# Patient Record
Sex: MALE | Race: BLACK OR AFRICAN AMERICAN | NOT HISPANIC OR LATINO | Employment: PART TIME | URBAN - METROPOLITAN AREA
[De-identification: names, ages, dates, MRNs, and addresses within clinical notes are randomized per-mention and may not be internally consistent; named-entity substitution may affect disease eponyms.]

---

## 2017-11-13 ENCOUNTER — OFFICE VISIT (OUTPATIENT)
Dept: OPHTHALMOLOGY | Facility: CLINIC | Age: 22
End: 2017-11-13

## 2017-11-13 DIAGNOSIS — H35.462: ICD-10-CM

## 2017-11-13 DIAGNOSIS — H52.13 MYOPIA OF BOTH EYES: Primary | ICD-10-CM

## 2017-11-13 ASSESSMENT — VISUAL ACUITY
OS_SC+: -2
OS_PH_SC: 20/20-
OD_SC: 20/40
OS_SC: 20/20
OD_SC+: -1
OD_PH_SC: 20/20-
METHOD_MR_RETINOSCOPY: 1
METHOD: SNELLEN - LINEAR
OS_SC: 20/30
OD_SC: 20/20

## 2017-11-13 ASSESSMENT — CUP TO DISC RATIO
OS_RATIO: 0.3
OD_RATIO: 0.3

## 2017-11-13 ASSESSMENT — REFRACTION_MANIFEST
OS_CYLINDER: +0.25
OD_SPHERE: -1.00
OD_AXIS: 075
OS_SPHERE: -0.50
OD_SPHERE: -0.25
OS_AXIS: 100
OS_CYLINDER: +0.75
OS_AXIS: 090
OD_CYLINDER: +0.25
OD_CYLINDER: +0.50
OS_SPHERE: -1.00
OD_AXIS: 090

## 2017-11-13 ASSESSMENT — TONOMETRY
IOP_METHOD: ICARE
OS_IOP_MMHG: 14
OD_IOP_MMHG: 15

## 2017-11-13 ASSESSMENT — SLIT LAMP EXAM - LIDS
COMMENTS: NORMAL
COMMENTS: NORMAL

## 2017-11-13 ASSESSMENT — EXTERNAL EXAM - RIGHT EYE: OD_EXAM: NORMAL

## 2017-11-13 ASSESSMENT — EXTERNAL EXAM - LEFT EYE: OS_EXAM: NORMAL

## 2017-11-13 ASSESSMENT — CONF VISUAL FIELD
OD_NORMAL: 1
OS_NORMAL: 1

## 2017-11-13 NOTE — PROGRESS NOTES
Assessment/Plan  (H52.13) Myopia of both eyes  (primary encounter diagnosis)  Comment: Myopia OU, first eye exam  Plan: REFRACTION [23040]         Educated patient on condition and clinical findings. Dispensed spectacle prescription for full time wear. Educated patient on possibility of adaptation period, if symptoms do not improve return to clinic for further testing.    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 Mai for consultation      Complete documentation of historical and exam elements from today's encounter can  be found in the full encounter summary report (not reduplicated in this progress  note). I personally obtained the chief complaint(s) and history of present illness. I  confirmed and edited as necessary the review of systems, past medical/surgical  history, family history, social history, and examination findings as documented by  others; and I examined the patient myself. I personally reviewed the relevant tests,  images, and reports as documented above. I formulated and edited as necessary the  assessment and plan and discussed the findings and management plan with the  patient and family.    Holger Frank, NAMAN, FAAO

## 2017-11-13 NOTE — MR AVS SNAPSHOT
After Visit Summary   2017    Brie Felipe    MRN: [id number]           Patient Information     Date Of Birth          1995        Visit Information        Provider Department      2017 10:40 AM Holger Frank OD [facility]         Follow-ups after your visit        Your next 10 appointments already scheduled     2017  9:15 AM CST   NEW RETINA with Fredericus Darinel Mai MD   Eye Clinic ([facility])    [address]  [address]  [address]  [facility] [address]   [phone]              Who to contact     Please call your clinic at [phone] to:    Ask questions about your health    Make or cancel appointments    Discuss your medicines    Learn about your test results    Speak to your doctor   If you have compliments or concerns about an experience at your clinic, or if you wish to file a complaint, please contact [facility] Patient Relations at [phone] or email us at [email or web address]         Additional Information About Your Visit        MyChart Information     Pixlee is an electronic gateway that provides easy, online access to your medical records. With Pixlee, you can request a clinic appointment, read your test results, renew a prescription or communicate with your care team.     To sign up for Pixlee visit the website at [email or web address]   You will be asked to enter the access code listed below, as well as some personal information. Please follow the directions to create your username and password.     Your access code is: [email or web address]  Expires: 2018 12:15 PM     Your access code will  in 90 days. If you need help or a new code, please contact your [facility] or call [phone] for assistance.        Care EveryWhere ID     This is your Care EveryWhere ID. This could be used by other  organizations to access your [place] medical records  [id number]         Blood Pressure from Last 3 Encounters:   No data found for BP    Weight from Last 3 Encounters:   No data found for Wt              Today, you had the following     No orders found for display       Primary Care Provider    None Specified       No primary provider on file.        Equal Access to Services     TENISHAMILLI JOANNE : Hadii aad solo hadsylviao Sosarwatali, waaxda luqadaha, qaybta kaalmada adeegyada, brody dumont sallyhelene dugandanaisabelle gannon . So [facility] [phone].    ATENCIÓN: Si habla español, tiene a barbosa disposición servicios gratuitos de asistencia lingüística. Llame al [phone].    We comply with applicable federal civil rights laws and [place] laws. We do not discriminate on the basis of race, color, national origin, age, disability, sex, sexual orientation, or gender identity.            Thank you!     Thank you for [facility]  for your care. Our goal is always to provide you with excellent care. Hearing back from our patients is one way we can continue to improve our services. Please take a few minutes to complete the written survey that you may receive in the mail after your visit with us. Thank you!             Your Updated Medication List - Protect others around you: Learn how to safely use, store and throw away your medicines at www.disposemymeds.org.      Notice  As of 11/13/2017 11:48 AM    You have not been prescribed any medications.

## 2017-11-16 ENCOUNTER — OFFICE VISIT (OUTPATIENT)
Dept: OPHTHALMOLOGY | Facility: CLINIC | Age: 22
End: 2017-11-16
Attending: OPHTHALMOLOGY
Payer: COMMERCIAL

## 2017-11-16 DIAGNOSIS — H35.462: Primary | ICD-10-CM

## 2017-11-16 PROCEDURE — 92250 FUNDUS PHOTOGRAPHY W/I&R: CPT | Mod: ZF | Performed by: OPHTHALMOLOGY

## 2017-11-16 PROCEDURE — 99213 OFFICE O/P EST LOW 20 MIN: CPT | Mod: ZF

## 2017-11-16 ASSESSMENT — REFRACTION_WEARINGRX
OS_SPHERE: -1.00
OD_SPHERE: -1.00
OS_CYLINDER: +0.25
OS_AXIS: 100
OD_CYLINDER: +0.25
OD_AXIS: 075

## 2017-11-16 ASSESSMENT — EXTERNAL EXAM - LEFT EYE: OS_EXAM: NORMAL

## 2017-11-16 ASSESSMENT — TONOMETRY
OD_IOP_MMHG: 18
IOP_METHOD: TONOPEN
OS_IOP_MMHG: 17

## 2017-11-16 ASSESSMENT — CUP TO DISC RATIO
OD_RATIO: 0.3
OS_RATIO: 0.3

## 2017-11-16 ASSESSMENT — VISUAL ACUITY
CORRECTION_TYPE: GLASSES
OD_CC: 20/20
METHOD: SNELLEN - LINEAR
OS_CC: 20/20
OD_CC: 20/20
METHOD: SNELLEN - LINEAR

## 2017-11-16 ASSESSMENT — SLIT LAMP EXAM - LIDS
COMMENTS: NORMAL
COMMENTS: NORMAL

## 2017-11-16 ASSESSMENT — CONF VISUAL FIELD
OS_NORMAL: 1
OD_NORMAL: 1

## 2017-11-16 ASSESSMENT — EXTERNAL EXAM - RIGHT EYE: OD_EXAM: NORMAL

## 2017-11-16 NOTE — NURSING NOTE
Chief Complaints and History of Present Illnesses   Patient presents with     Consult For     vitreous tuft     HPI    Additional Referring Providers:  referred by Dr. Frank   Affected eye(s):  Both   Symptoms:        Unknown duration    Frequency:  Constant       Do you have eye pain now?:  No      Comments:  He was referred by Dr Frank for a consult for vitreous tuft OU  He has a glasses RX but it is still being filled. He says his vision is better with glasses on.   He says for the past three weeks or so when he wakes, he sometimes has discomfort in both eyes.   He says he sees floaters sometimes but denies flashes.    Heriberto James COT 10:00 AM November 16, 2017

## 2017-11-16 NOTE — PROGRESS NOTES
I have confirmed the patient's and reviewed Past Medical History, Past Surgical History, Social History, Family History, Problem List, Medication List and agree with Tech note.    CC: blurry vision    HPI: Brie Felipe is a 22 year old male who is referred by Dr Frank for evaluation of vitreous tuft, left eye. Patient reports having blurry vision and was given new eye glasses prescription. Complains of some eye redness. Denies current photopsias. Has some rare flashing lights.     Assessment/plan:   1.  Cystic retinal lesion, left eye   - OPTOS photo today       RTC 3 months    Pedro Luis Lopze MD  PGY3    ATTESTATION:  I have seen and examined the patient with Dr. Lopez and agree with the findings in this note, as well as the interpretations of the diagnostic tests.    Sb Dong MD PhD.  Professor & Chair

## 2017-11-16 NOTE — MR AVS SNAPSHOT
After Visit Summary   2017    Brie Felipe    MRN: [id number]           Patient Information     Date Of Birth          1995        Visit Information        Provider Department      2017 9:15 AM Sb Mai MD [facility]        Today's Diagnoses     Cystic retinal tuft, left eye    -  1       Follow-ups after your visit        Follow-up notes from your care team     Return in about 4 weeks (around 2017) for Exam & OCT OU.      Who to contact     Please call your clinic at [phone] to:    Ask questions about your health    Make or cancel appointments    Discuss your medicines    Learn about your test results    Speak to your doctor   If you have compliments or concerns about an experience at your clinic, or if you wish to file a complaint, please contact [facility] Patient Relations at [phone] or email us at [email or web address]         Additional Information About Your Visit        MyChart Information     "[employer]t is an electronic gateway that provides easy, online access to your medical records. With Velostack, you can request a clinic appointment, read your test results, renew a prescription or communicate with your care team.     To sign up for "[employer]t visit the website at [email or web address]   You will be asked to enter the access code listed below, as well as some personal information. Please follow the directions to create your username and password.     Your access code is: [email or web address]  Expires: 2018 12:15 PM     Your access code will  in 90 days. If you need help or a new code, please contact your [facility] or call [phone] for assistance.        Care EveryWhere ID     This is your Care EveryWhere ID. This could be used by other organizations to access your [place] medical records  [id number]         Blood Pressure from Last 3 Encounters:   No  data found for BP    Weight from Last 3 Encounters:   No data found for Wt              We Performed the Following     Fundus Photos OS (left eye)        Primary Care Provider    None Specified       No primary provider on file.        Equal Access to Services     DIMITRI RUBIO : Fausto Koehler, clari hurtado, lucíanirali vyasjovana joserupesh, waxhailey rosalindain hayaahelene garciataylor hernandes jagdeep gutierrez. So [facility] [phone].    ATENCIÓN: Si habla español, tiene a barbosa disposición servicios gratuitos de asistencia lingüística. Llame al [phone].    We comply with applicable federal civil rights laws and [place] laws. We do not discriminate on the basis of race, color, national origin, age, disability, sex, sexual orientation, or gender identity.            Thank you!     Thank you for choosing EYE CLINIC  for your care. Our goal is always to provide you with excellent care. Hearing back from our patients is one way we can continue to improve our services. Please take a few minutes to complete the written survey that you may receive in the mail after your visit with us. Thank you!             Your Updated Medication List - Protect others around you: Learn how to safely use, store and throw away your medicines at www.disposemymeds.org.      Notice  As of 11/16/2017 12:03 PM    You have not been prescribed any medications.

## 2022-09-23 RX ORDER — LANOLIN ALCOHOL/MO/W.PET/CERES
100 CREAM (GRAM) TOPICAL DAILY
COMMUNITY

## 2022-09-27 ENCOUNTER — ANESTHESIA EVENT (OUTPATIENT)
Dept: SURGERY | Facility: CLINIC | Age: 27
End: 2022-09-27
Payer: COMMERCIAL

## 2022-09-27 ENCOUNTER — APPOINTMENT (OUTPATIENT)
Dept: GENERAL RADIOLOGY | Facility: CLINIC | Age: 27
End: 2022-09-27
Attending: ORTHOPAEDIC SURGERY
Payer: COMMERCIAL

## 2022-09-27 ENCOUNTER — HOSPITAL ENCOUNTER (OUTPATIENT)
Facility: CLINIC | Age: 27
Discharge: HOME | End: 2022-09-27
Attending: ORTHOPAEDIC SURGERY | Admitting: ORTHOPAEDIC SURGERY
Payer: COMMERCIAL

## 2022-09-27 ENCOUNTER — ANESTHESIA (OUTPATIENT)
Dept: SURGERY | Facility: CLINIC | Age: 27
End: 2022-09-27
Payer: COMMERCIAL

## 2022-09-27 VITALS
TEMPERATURE: 97.5 F | BODY MASS INDEX: 21.22 KG/M2 | DIASTOLIC BLOOD PRESSURE: 91 MMHG | HEART RATE: 95 BPM | HEIGHT: 72 IN | SYSTOLIC BLOOD PRESSURE: 125 MMHG | RESPIRATION RATE: 15 BRPM | WEIGHT: 156.7 LBS | OXYGEN SATURATION: 100 %

## 2022-09-27 DIAGNOSIS — S83.511A RUPTURE OF ANTERIOR CRUCIATE LIGAMENT OF RIGHT KNEE, INITIAL ENCOUNTER: Primary | ICD-10-CM

## 2022-09-27 PROCEDURE — 710N000012 HC RECOVERY PHASE 2, PER MINUTE: Performed by: ORTHOPAEDIC SURGERY

## 2022-09-27 PROCEDURE — 258N000001 HC RX 258: Performed by: ORTHOPAEDIC SURGERY

## 2022-09-27 PROCEDURE — 999N000141 HC STATISTIC PRE-PROCEDURE NURSING ASSESSMENT: Performed by: ORTHOPAEDIC SURGERY

## 2022-09-27 PROCEDURE — 710N000009 HC RECOVERY PHASE 1, LEVEL 1, PER MIN: Performed by: ORTHOPAEDIC SURGERY

## 2022-09-27 PROCEDURE — 250N000011 HC RX IP 250 OP 636: Performed by: ORTHOPAEDIC SURGERY

## 2022-09-27 PROCEDURE — 250N000009 HC RX 250: Performed by: ORTHOPAEDIC SURGERY

## 2022-09-27 PROCEDURE — 250N000011 HC RX IP 250 OP 636: Performed by: NURSE ANESTHETIST, CERTIFIED REGISTERED

## 2022-09-27 PROCEDURE — 999N000179 XR SURGERY CARM FLUORO LESS THAN 5 MIN W STILLS: Mod: TC

## 2022-09-27 PROCEDURE — C1713 ANCHOR/SCREW BN/BN,TIS/BN: HCPCS | Performed by: ORTHOPAEDIC SURGERY

## 2022-09-27 PROCEDURE — 258N000003 HC RX IP 258 OP 636: Performed by: NURSE ANESTHETIST, CERTIFIED REGISTERED

## 2022-09-27 PROCEDURE — 360N000084 HC SURGERY LEVEL 4 W/ FLUORO, PER MIN: Performed by: ORTHOPAEDIC SURGERY

## 2022-09-27 PROCEDURE — C1762 CONN TISS, HUMAN(INC FASCIA): HCPCS | Performed by: ORTHOPAEDIC SURGERY

## 2022-09-27 PROCEDURE — 370N000017 HC ANESTHESIA TECHNICAL FEE, PER MIN: Performed by: ORTHOPAEDIC SURGERY

## 2022-09-27 PROCEDURE — 272N000002 HC OR SUPPLY OTHER OPNP: Performed by: ORTHOPAEDIC SURGERY

## 2022-09-27 PROCEDURE — 250N000013 HC RX MED GY IP 250 OP 250 PS 637: Performed by: PHYSICIAN ASSISTANT

## 2022-09-27 PROCEDURE — 250N000013 HC RX MED GY IP 250 OP 250 PS 637: Performed by: ORTHOPAEDIC SURGERY

## 2022-09-27 PROCEDURE — 250N000011 HC RX IP 250 OP 636: Performed by: ANESTHESIOLOGY

## 2022-09-27 PROCEDURE — 272N000001 HC OR GENERAL SUPPLY STERILE: Performed by: ORTHOPAEDIC SURGERY

## 2022-09-27 DEVICE — TIGHTROPE® II ABS, IMPLANT
Type: IMPLANTABLE DEVICE | Site: KNEE | Status: FUNCTIONAL
Brand: ARTHREX®

## 2022-09-27 DEVICE — BIO-COMP SWIVELOCK C, CLD 5.5X19.1MM
Type: IMPLANTABLE DEVICE | Site: KNEE | Status: FUNCTIONAL
Brand: ARTHREX®

## 2022-09-27 DEVICE — HTO ANCHOR, IBALANCE CORTICAL 32MM
Type: IMPLANTABLE DEVICE | Site: KNEE | Status: FUNCTIONAL
Brand: ARTHREX®

## 2022-09-27 DEVICE — Ø7X 20MM BC IF SCRW, VENTED
Type: IMPLANTABLE DEVICE | Site: KNEE | Status: FUNCTIONAL
Brand: ARTHREX®

## 2022-09-27 DEVICE — HTO ANCHOR, IBALANCE CORTICAL 42MM
Type: IMPLANTABLE DEVICE | Site: KNEE | Status: FUNCTIONAL
Brand: ARTHREX®

## 2022-09-27 DEVICE — HTO ANCHOR, IBALANCE CANCELLOUS 24MM
Type: IMPLANTABLE DEVICE | Site: KNEE | Status: FUNCTIONAL
Brand: ARTHREX®

## 2022-09-27 DEVICE — IMP ANCHOR ARTHREX BIO-SWIVELOCK 4.75X22MM AR-2324BCC-2: Type: IMPLANTABLE DEVICE | Site: KNEE | Status: FUNCTIONAL

## 2022-09-27 DEVICE — HTO IMPL,IBAL SM 6°/MED 5°
Type: IMPLANTABLE DEVICE | Site: KNEE | Status: FUNCTIONAL
Brand: ARTHREX®

## 2022-09-27 DEVICE — TIGHTROPE ABS BUTTON ROUND 14MM CONCAVE
Type: IMPLANTABLE DEVICE | Site: KNEE | Status: FUNCTIONAL
Brand: ARTHREX®

## 2022-09-27 DEVICE — SWVLK TENO BIO-COMP 7X 19.5MM
Type: IMPLANTABLE DEVICE | Site: KNEE | Status: FUNCTIONAL
Brand: ARTHREX®

## 2022-09-27 DEVICE — HTO ANCHOR, IBALANCE CANCELLOUS 30MM
Type: IMPLANTABLE DEVICE | Site: KNEE | Status: FUNCTIONAL
Brand: ARTHREX®

## 2022-09-27 DEVICE — GRAFT TENDON TIBIALIS ANTERIOR 430335: Type: IMPLANTABLE DEVICE | Site: KNEE | Status: FUNCTIONAL

## 2022-09-27 RX ORDER — OXYCODONE HYDROCHLORIDE 5 MG/1
5 TABLET ORAL EVERY 4 HOURS PRN
Status: DISCONTINUED | OUTPATIENT
Start: 2022-09-27 | End: 2022-09-27 | Stop reason: HOSPADM

## 2022-09-27 RX ORDER — AMOXICILLIN 250 MG
1-2 CAPSULE ORAL 2 TIMES DAILY
Qty: 30 TABLET | Refills: 0 | Status: SHIPPED | OUTPATIENT
Start: 2022-09-27

## 2022-09-27 RX ORDER — SODIUM CHLORIDE, SODIUM LACTATE, POTASSIUM CHLORIDE, CALCIUM CHLORIDE 600; 310; 30; 20 MG/100ML; MG/100ML; MG/100ML; MG/100ML
INJECTION, SOLUTION INTRAVENOUS CONTINUOUS
Status: DISCONTINUED | OUTPATIENT
Start: 2022-09-27 | End: 2022-09-27 | Stop reason: HOSPADM

## 2022-09-27 RX ORDER — CEFAZOLIN SODIUM/WATER 2 G/20 ML
2 SYRINGE (ML) INTRAVENOUS SEE ADMIN INSTRUCTIONS
Status: DISCONTINUED | OUTPATIENT
Start: 2022-09-27 | End: 2022-09-27 | Stop reason: HOSPADM

## 2022-09-27 RX ORDER — ACETAMINOPHEN 325 MG/1
650 TABLET ORAL EVERY 4 HOURS PRN
Qty: 50 TABLET | Refills: 0 | Status: SHIPPED | OUTPATIENT
Start: 2022-09-27

## 2022-09-27 RX ORDER — OXYCODONE HYDROCHLORIDE 5 MG/1
5 TABLET ORAL
Status: COMPLETED | OUTPATIENT
Start: 2022-09-27 | End: 2022-09-27

## 2022-09-27 RX ORDER — METOPROLOL TARTRATE 1 MG/ML
1-2 INJECTION, SOLUTION INTRAVENOUS EVERY 5 MIN PRN
Status: DISCONTINUED | OUTPATIENT
Start: 2022-09-27 | End: 2022-09-27 | Stop reason: HOSPADM

## 2022-09-27 RX ORDER — HYDRALAZINE HYDROCHLORIDE 20 MG/ML
2.5-5 INJECTION INTRAMUSCULAR; INTRAVENOUS EVERY 10 MIN PRN
Status: DISCONTINUED | OUTPATIENT
Start: 2022-09-27 | End: 2022-09-27 | Stop reason: HOSPADM

## 2022-09-27 RX ORDER — TRANEXAMIC ACID 650 MG/1
1950 TABLET ORAL ONCE
Status: COMPLETED | OUTPATIENT
Start: 2022-09-27 | End: 2022-09-27

## 2022-09-27 RX ORDER — FENTANYL CITRATE 50 UG/ML
50 INJECTION, SOLUTION INTRAMUSCULAR; INTRAVENOUS
Status: DISCONTINUED | OUTPATIENT
Start: 2022-09-27 | End: 2022-09-27 | Stop reason: HOSPADM

## 2022-09-27 RX ORDER — ONDANSETRON 2 MG/ML
4 INJECTION INTRAMUSCULAR; INTRAVENOUS EVERY 30 MIN PRN
Status: DISCONTINUED | OUTPATIENT
Start: 2022-09-27 | End: 2022-09-27 | Stop reason: HOSPADM

## 2022-09-27 RX ORDER — OXYCODONE HYDROCHLORIDE 5 MG/1
5 TABLET ORAL EVERY 4 HOURS
Qty: 30 TABLET | Refills: 0 | Status: SHIPPED | OUTPATIENT
Start: 2022-09-27 | End: 2022-09-30

## 2022-09-27 RX ORDER — DEXAMETHASONE SODIUM PHOSPHATE 4 MG/ML
INJECTION, SOLUTION INTRA-ARTICULAR; INTRALESIONAL; INTRAMUSCULAR; INTRAVENOUS; SOFT TISSUE PRN
Status: DISCONTINUED | OUTPATIENT
Start: 2022-09-27 | End: 2022-09-27

## 2022-09-27 RX ORDER — KETOROLAC TROMETHAMINE 15 MG/ML
15 INJECTION, SOLUTION INTRAMUSCULAR; INTRAVENOUS EVERY 6 HOURS PRN
Status: DISCONTINUED | OUTPATIENT
Start: 2022-09-27 | End: 2022-09-27 | Stop reason: HOSPADM

## 2022-09-27 RX ORDER — PROPOFOL 10 MG/ML
INJECTION, EMULSION INTRAVENOUS PRN
Status: DISCONTINUED | OUTPATIENT
Start: 2022-09-27 | End: 2022-09-27

## 2022-09-27 RX ORDER — FENTANYL CITRATE 50 UG/ML
INJECTION, SOLUTION INTRAMUSCULAR; INTRAVENOUS PRN
Status: DISCONTINUED | OUTPATIENT
Start: 2022-09-27 | End: 2022-09-27

## 2022-09-27 RX ORDER — IBUPROFEN 600 MG/1
600 TABLET, FILM COATED ORAL
Status: DISCONTINUED | OUTPATIENT
Start: 2022-09-27 | End: 2022-09-27 | Stop reason: HOSPADM

## 2022-09-27 RX ORDER — HYDROMORPHONE HCL IN WATER/PF 6 MG/30 ML
0.4 PATIENT CONTROLLED ANALGESIA SYRINGE INTRAVENOUS EVERY 5 MIN PRN
Status: DISCONTINUED | OUTPATIENT
Start: 2022-09-27 | End: 2022-09-27 | Stop reason: HOSPADM

## 2022-09-27 RX ORDER — FENTANYL CITRATE 50 UG/ML
50 INJECTION, SOLUTION INTRAMUSCULAR; INTRAVENOUS EVERY 5 MIN PRN
Status: DISCONTINUED | OUTPATIENT
Start: 2022-09-27 | End: 2022-09-27 | Stop reason: HOSPADM

## 2022-09-27 RX ORDER — LIDOCAINE 40 MG/G
CREAM TOPICAL
Status: DISCONTINUED | OUTPATIENT
Start: 2022-09-27 | End: 2022-09-27 | Stop reason: HOSPADM

## 2022-09-27 RX ORDER — MEPERIDINE HYDROCHLORIDE 25 MG/ML
12.5 INJECTION INTRAMUSCULAR; INTRAVENOUS; SUBCUTANEOUS
Status: DISCONTINUED | OUTPATIENT
Start: 2022-09-27 | End: 2022-09-27 | Stop reason: HOSPADM

## 2022-09-27 RX ORDER — SODIUM CHLORIDE, SODIUM LACTATE, POTASSIUM CHLORIDE, CALCIUM CHLORIDE 600; 310; 30; 20 MG/100ML; MG/100ML; MG/100ML; MG/100ML
INJECTION, SOLUTION INTRAVENOUS CONTINUOUS PRN
Status: DISCONTINUED | OUTPATIENT
Start: 2022-09-27 | End: 2022-09-27

## 2022-09-27 RX ORDER — CEFAZOLIN SODIUM/WATER 2 G/20 ML
2 SYRINGE (ML) INTRAVENOUS
Status: DISCONTINUED | OUTPATIENT
Start: 2022-09-27 | End: 2022-09-27 | Stop reason: HOSPADM

## 2022-09-27 RX ORDER — IBUPROFEN 600 MG/1
600 TABLET, FILM COATED ORAL EVERY 6 HOURS PRN
Qty: 40 TABLET | Refills: 0 | Status: SHIPPED | OUTPATIENT
Start: 2022-09-27

## 2022-09-27 RX ORDER — ONDANSETRON 4 MG/1
4 TABLET, ORALLY DISINTEGRATING ORAL EVERY 8 HOURS PRN
Qty: 4 TABLET | Refills: 0 | Status: SHIPPED | OUTPATIENT
Start: 2022-09-27

## 2022-09-27 RX ORDER — MAGNESIUM HYDROXIDE 1200 MG/15ML
LIQUID ORAL PRN
Status: DISCONTINUED | OUTPATIENT
Start: 2022-09-27 | End: 2022-09-27 | Stop reason: HOSPADM

## 2022-09-27 RX ORDER — ACETAMINOPHEN 325 MG/1
975 TABLET ORAL ONCE
Status: COMPLETED | OUTPATIENT
Start: 2022-09-27 | End: 2022-09-27

## 2022-09-27 RX ORDER — VANCOMYCIN HYDROCHLORIDE 1 G/20ML
INJECTION, POWDER, LYOPHILIZED, FOR SOLUTION INTRAVENOUS PRN
Status: DISCONTINUED | OUTPATIENT
Start: 2022-09-27 | End: 2022-09-27 | Stop reason: HOSPADM

## 2022-09-27 RX ORDER — ONDANSETRON 2 MG/ML
INJECTION INTRAMUSCULAR; INTRAVENOUS PRN
Status: DISCONTINUED | OUTPATIENT
Start: 2022-09-27 | End: 2022-09-27

## 2022-09-27 RX ORDER — ONDANSETRON 4 MG/1
4 TABLET, ORALLY DISINTEGRATING ORAL
Status: DISCONTINUED | OUTPATIENT
Start: 2022-09-27 | End: 2022-09-27 | Stop reason: HOSPADM

## 2022-09-27 RX ORDER — ONDANSETRON 4 MG/1
4 TABLET, ORALLY DISINTEGRATING ORAL EVERY 30 MIN PRN
Status: DISCONTINUED | OUTPATIENT
Start: 2022-09-27 | End: 2022-09-27 | Stop reason: HOSPADM

## 2022-09-27 RX ORDER — HYDROXYZINE HYDROCHLORIDE 25 MG/1
25 TABLET, FILM COATED ORAL 3 TIMES DAILY PRN
Qty: 40 TABLET | Refills: 0 | Status: SHIPPED | OUTPATIENT
Start: 2022-09-27

## 2022-09-27 RX ADMIN — FENTANYL CITRATE 100 MCG: 50 INJECTION, SOLUTION INTRAMUSCULAR; INTRAVENOUS at 07:57

## 2022-09-27 RX ADMIN — DEXAMETHASONE SODIUM PHOSPHATE 8 MG: 4 INJECTION, SOLUTION INTRA-ARTICULAR; INTRALESIONAL; INTRAMUSCULAR; INTRAVENOUS; SOFT TISSUE at 08:14

## 2022-09-27 RX ADMIN — FENTANYL CITRATE 100 MCG: 50 INJECTION, SOLUTION INTRAMUSCULAR; INTRAVENOUS at 09:25

## 2022-09-27 RX ADMIN — Medication 2 G: at 12:14

## 2022-09-27 RX ADMIN — HYDROMORPHONE HYDROCHLORIDE 1 MG: 1 INJECTION, SOLUTION INTRAMUSCULAR; INTRAVENOUS; SUBCUTANEOUS at 12:07

## 2022-09-27 RX ADMIN — ONDANSETRON 4 MG: 2 INJECTION INTRAMUSCULAR; INTRAVENOUS at 14:52

## 2022-09-27 RX ADMIN — SODIUM CHLORIDE, POTASSIUM CHLORIDE, SODIUM LACTATE AND CALCIUM CHLORIDE: 600; 310; 30; 20 INJECTION, SOLUTION INTRAVENOUS at 06:57

## 2022-09-27 RX ADMIN — PROPOFOL 200 MG: 10 INJECTION, EMULSION INTRAVENOUS at 08:14

## 2022-09-27 RX ADMIN — MIDAZOLAM 2 MG: 1 INJECTION INTRAMUSCULAR; INTRAVENOUS at 07:57

## 2022-09-27 RX ADMIN — OXYCODONE HYDROCHLORIDE 5 MG: 5 TABLET ORAL at 13:55

## 2022-09-27 RX ADMIN — ONDANSETRON HYDROCHLORIDE 4 MG: 2 INJECTION, SOLUTION INTRAVENOUS at 12:28

## 2022-09-27 RX ADMIN — SODIUM CHLORIDE, POTASSIUM CHLORIDE, SODIUM LACTATE AND CALCIUM CHLORIDE: 600; 310; 30; 20 INJECTION, SOLUTION INTRAVENOUS at 10:04

## 2022-09-27 RX ADMIN — FENTANYL CITRATE 100 MCG: 50 INJECTION, SOLUTION INTRAMUSCULAR; INTRAVENOUS at 08:15

## 2022-09-27 RX ADMIN — Medication 2 G: at 08:14

## 2022-09-27 RX ADMIN — TRANEXAMIC ACID 1950 MG: 650 TABLET ORAL at 07:25

## 2022-09-27 RX ADMIN — ACETAMINOPHEN 975 MG: 325 TABLET, FILM COATED ORAL at 07:25

## 2022-09-27 ASSESSMENT — ACTIVITIES OF DAILY LIVING (ADL)
ADLS_ACUITY_SCORE: 37
ADLS_ACUITY_SCORE: 35

## 2022-09-27 NOTE — DISCHARGE INSTRUCTIONS
GENERAL ANESTHESIA OR SEDATION ADULT DISCHARGE INSTRUCTIONS   SPECIAL PRECAUTIONS FOR 24 HOURS AFTER SURGERY    IT IS NOT UNUSUAL TO FEEL LIGHT-HEADED OR FAINT, UP TO 24 HOURS AFTER SURGERY OR WHILE TAKING PAIN MEDICATION.  IF YOU HAVE THESE SYMPTOMS; SIT FOR A FEW MINUTES BEFORE STANDING AND HAVE SOMEONE ASSIST YOU WHEN YOU GET UP TO WALK OR USE THE BATHROOM.    YOU SHOULD REST AND RELAX FOR THE NEXT 24 HOURS AND YOU MUST MAKE ARRANGEMENTS TO HAVE SOMEONE STAY WITH YOU FOR AT LEAST 24 HOURS AFTER YOUR DISCHARGE.  AVOID HAZARDOUS AND STRENUOUS ACTIVITIES.  DO NOT MAKE IMPORTANT DECISIONS FOR 24 HOURS.    DO NOT DRIVE ANY VEHICLE OR OPERATE MECHANICAL EQUIPMENT FOR 24 HOURS FOLLOWING THE END OF YOUR SURGERY.  EVEN THOUGH YOU MAY FEEL NORMAL, YOUR REACTIONS MAY BE AFFECTED BY THE MEDICATION YOU HAVE RECEIVED.    DO NOT DRINK ALCOHOLIC BEVERAGES FOR 24 HOURS FOLLOWING YOUR SURGERY.    DRINK CLEAR LIQUIDS (APPLE JUICE, GINGER ALE, 7-UP, BROTH, ETC.).  PROGRESS TO YOUR REGULAR DIET AS YOU FEEL ABLE.    YOU MAY HAVE A DRY MOUTH, A SORE THROAT, MUSCLES ACHES OR TROUBLE SLEEPING.  THESE SHOULD GO AWAY AFTER 24 HOURS.    CALL YOUR DOCTOR FOR ANY OF THE FOLLOWING:  SIGNS OF INFECTION (FEVER, GROWING TENDERNESS AT THE SURGERY SITE, A LARGE AMOUNT OF DRAINAGE OR BLEEDING, SEVERE PAIN, FOUL-SMELLING DRAINAGE, REDNESS OR SWELLING.    IT HAS BEEN OVER 8 TO 10 HOURS SINCE SURGERY AND YOU ARE STILL NOT ABLE TO URINATE (PASS WATER).     Maximum acetaminophen (Tylenol) dose from all sources should not exceed 4 grams (4000 mg) per day.    You received Ibuprofen (Motrin) at 2:00pm*.  Do not take any Ibuprofen products until 8:00pm*.      KNEE ARTHROSCOPY DISCHARGE INSTRUCTIONS  [facility]  DR. PERRY HUDSON   [phone]    ACTIVITY  KEEP YOUR LEG ELEVATED WITH A PILLOW UNDER YOUR CALF, NOT UNDER THE KNEE.  YOU SHOULD ATTEMPT TO KEEP YOUR KNEE ABOVE THE LEVEL OF YOUR HEART AND YOUR ANKLE ABOVE THE LEVEL OF YOUR KNEE FOR THE  FIRST 2-3 DAYS.  THIS IS THE BEST POSITION TO REDUCE SWELLING.  USE YOUR CRUTCHES IF NECESSARY; GENERALLY YOU CAN PLACE AS MUCH WEIGHT ON YOUR LEG AS PAIN AND SWELLING PERMIT.  IF YOU HAVE INCREASING PAIN OR SWELLING, YOU SHOULD NOT BE USING THE LEG AS MUCH.  ONCE YOU WALK WITHOUT PAIN OR A LIMP; THEN CRUTCHES CAN BE GRADUALLY DISCONTINUED IF YOU ARE USING THEM.    DRESSING  YOU MAY REMOVE THE BANDAGE AND GAUZE FROM YOUR KNEE TWO DAYS AFTER SURGERY.  IF YOU HAVE STERI-STRIPS, LEAVE THEM ON AT THIS TIME.  APPLY BAND-AIDS TO THE WOUNDS.  THE BANDAGES YOU REMOVE MAY BE WET TO THE TOUCH.  THIS IS NORMAL AS THE KNEE IS FILLED WITH WATER DURING THE SURGERY AND IT LEAKS OUT 24-36 HOURS AFTER SURGERY.    KEEP YOUR BANDAGES AND WOUNDS DRY.    IIF THE WOUNDS DO GET WET, REMOVE THE BAND-AIDS, PAT DRY AND REAPPLY FRESH BAND-AIDS.  CHANGE YOUR BAND-AIDS DAILY.    YOU MAY SHOWER WITH THE WOUNDS EXPOSED TWO DAYS AFTER SURGERY.  STILL, HOWEVER, THE WOUNDS ARE NOT TO BE SOAKED (NO TUBS, HOT TUBS, JACUZZIS OR SWIMMING POOLS).  ADDITIONALLY, YOU SHOULD STILL AVOID USE OF A SAUNA OR HEATING PAD.  THIS WILL ONLY PROVOKE SWELLING.    ICE PACKS  YOUR E-Z WRAP WILL BE PLACED ON YOUR KNEE IN THE POST ANESTHESIA RECOVERY ROOM AFTER SURGERY.    WHEN YOU GET HOME, KEEP THE CUFF ON FOR THE FIRST 24 HOURS AND KEEP IT COLD.  ALTERNATIVELY, IF YOU HAVE NOT BEEN PROVIDED WITH AN E-Z WRAP, PLACE ICE PACKS ON YOUR KNEE FOR 20-30 MINUTES 4-5 TIMES A DAY.  USE ICE PACKS FOR 4-5 DAYS.    PAIN CONTROL  TAKE THE PAIN MEDICATION AND/OR ANTI-INFLAMMATORY MEDICATION AS PRESCRIBED.  DON'T LET YOUR PAIN BECOME SEVERE.    OFFICE RETURN  PLEASE CALL THE OFFICE IN THE FIRST DAY OR TWO AFTER SURGERY TO   SCHEDULE A POST-OPERATIVE VISIT.  YOUR APPOINTMENT SHOULD BE 7-10 DAYS AFTER SURGERY.    IF AT ANY TIME THERE ARE SIGNS OF INFECTION:  INCREASED SWELLING, REDNESS, DRAINAGE FROM THE INCISIONS, WARMTH, FEVER, CHILLS OR SEVERE PAIN UNRELIEVED BY PRESCRIPTION  MEDICATIONS OR IF YOU HAVE ANY QUESTIONS OR CONCERNS, CONTACT US AT THE OFFICE: [phone].  PLEASE USE THIS NUMBER FOR EMERGENCY CALLS AND DO NOT CALL THE HOSPITAL/SURGERY CENTER.  THERE IS AN ANSWERING SERVICE AVAILABLE TO ASSIST YOU AFTER HOURS.

## 2022-09-27 NOTE — BRIEF OP NOTE
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]  Post-operative diagnosis Same as pre-operative diagnosis    Procedure: Procedure(s):  Right knee arthroscopic anterior cruciate ligament reconstruction with quadriceps tendon autograft, meniscus root repair, medial colateral ligament reconstruction, medial and lateral meniscal repair  High tibial osteotomy, osteochondral allograft transplant of the medial femoral condyle with fresh graft, right knee  Surgeon: Surgeon(s) and Role:     * Nahun Hammond MD - Primary     * Bro Santana PA-C - Assisting  Anesthesia: Choice   Estimated Blood Loss: 100 mL from 9/27/2022  8:11 AM to 9/27/2022 12:54 PM      Drains: None  Specimens: * No specimens in log *  Findings:   None.  Complications: None.  Implants:   Implant Name Type Inv. Item Serial No.  Lot No. LRB No. Used Action   AlloSync Expand 5cc Bone/Tissue/Biologic  [id number] ARTHREX [id number] Right 1 Implanted   IMP FIXATION TIGHTROPE II ATTACHABLE BUTTON SG-[id number] - [id number] Metallic Hardware/[place] IMP FIXATION TIGHTROPE II ATTACHABLE BUTTON [id number]  ARTHREX [id number] Right 1 Implanted   IMP FIXATION TIGHTROPE II ATTACHABLE BUTTON SY-1935TL-63 - [id number] Metallic Hardware/[place] IMP FIXATION TIGHTROPE II ATTACHABLE BUTTON [id number]-9730WS-89  ARTHREX [id number] Right 1 Implanted   GRAFT TENDON TIBIALIS ANTERIOR 818654 - [id number] Bone/Tissue/Biologic GRAFT TENDON TIBIALIS ANTERIOR 600547  MUSCULOSKELETAL SHEN [id number] Right 1 Implanted   IMP TIBIAL ARTHREX HTO IBALANCE SM 6D/MED 5D [id number] - [id number] Metallic Hardware/[place] IMP TIBIAL ARTHREX HTO IBALANCE SM 6D/MED 5D [id number]  ARTHREX [id number] Right 1 Implanted   IMP ANCHOR ARTHREX HTO IBALANCE CANCELLOUS 30MM AR-98792-59 -  [id number] Metallic Hardware/[place] IMP ANCHOR ARTHREX HTO IBALANCE CANCELLOUS 30MM AR-51401-08  ARTHREX [id number] Right 1 Implanted   IMP ANCHOR ARTHREX HTO IBALANCE CORTICAL 42MM AR-10615-91 - [id number] Metallic Hardware/[place] IMP ANCHOR ARTHREX HTO IBALANCE CORTICAL 42MM AR-30287-43  ARTHREX [id number] Right 1 Implanted   IMP ANCHOR ARTHREX HTO IBALANCE CORTICAL 32MM AR-11981-80 - [id number] Metallic Hardware/[place] IMP ANCHOR ARTHREX HTO IBALANCE CORTICAL 32MM AR-16061-55  ARTHREX [id number] Right 1 Implanted   IMP BUTTON ARTHREX ABS TIGHT ROPE 14MM BUTTON AR-1588TB-4 - [id number] Metallic Hardware/[place] IMP BUTTON ARTHREX ABS TIGHT ROPE 14MM BUTTON AR-1588TB-4  ARTHREX [id number] Right 1 Implanted   IMP ANCHOR ARTHREX BIO-SWIVELOCK 5.5MM AR-2323BCC - [id number] Metallic Hardware/[place] IMP ANCHOR ARTHREX BIO-SWIVELOCK 5.5MM AR-2323BCC  ARTHREX [id number] Right 1 Implanted   IMP SCR ARTHREX BIOCOMP INTERF FAST THRD 7X20MM AR-4020C-07 - [id number] Metallic Hardware/[place] IMP SCR ARTHREX BIOCOMP INTERF FAST THRD 7X20MM AR-4020C-07  ARTHREX [id number] Right 1 Implanted   IMP ANCHOR ARTHREX BC SWVLK TENODESIS 7X19.5MM AR-1662BC-7 - [id number] Metallic Hardware/[place] IMP ANCHOR ARTHREX BC SWVLK TENODESIS 7X19.5MM AR-1662BC-7  ARTHREX [id number] Right 1 Implanted   IMP ANCHOR ARTHREX BC SWVLK TENODESIS 7X19.5MM AR-1662BC-7 - [id number] Metallic Hardware/[place] IMP ANCHOR ARTHREX BC SWVLK TENODESIS 7X19.5MM AR-1662BC-7  ARTHREX [id number] Right 1 Implanted   IMP ANCHOR ARTHREX BIO-SWIVELOCK 4.57P50TB FP-8443YON-4 - [id number] Metallic Hardware/[place] IMP ANCHOR ARTHREX BIO-SWIVELOCK 4.37A95UF PB-8889XYJ-8  ARTHREX [id number] Right 1 Implanted   Med Femoral  Condyle, Right, 1: 7.00cm, 1: 2.70cm, 7.90cm, [facility] Bone/Tissue/Biologic  [id number]   Right 1 Implanted   IMP ANCHOR ARTHREX HTO IBALANCE CANCELLOUS 24MM AR-16986-74 - [id number] Metallic Hardware/[place] IMP ANCHOR ARTHREX HTO IBALANCE CANCELLOUS 24MM AR-49481-94  ARTHREX  [id number] Right 1 Implanted

## 2022-09-27 NOTE — ANESTHESIA PROCEDURE NOTES
Tibial (peroneal nerve sparing) Procedure Note    Pre-Procedure   Staff -        Anesthesiologist:  Perez Caputo MD       Performed By: Anesthesiologist       Location: pre-op       Pre-Anesthestic Checklist: patient identified, IV checked, site marked, risks and benefits discussed, informed consent, monitors and equipment checked, pre-op evaluation, at physician/surgeon's request and post-op pain management  Timeout:       Correct Patient: Yes        Correct Procedure: Yes        Correct Site: Yes        Correct Position: Yes        Correct Laterality: Yes        Site Marked: Yes  Procedure Documentation  Procedure: Tibial (peroneal nerve sparing)       Diagnosis: ACL RUPTURE       Laterality: right       Patient Position: supine       Patient Prep/Sterile Barriers: sterile gloves, mask       Skin prep: Betadine       Needle Type: insulated and short bevel       Needle Gauge: 21.        Needle Length (Inches): 4        Ultrasound guided       1. Ultrasound was used to identify targeted nerve, plexus, vascular marker, or fascial plane and place a needle adjacent to it in real-time.       2. Ultrasound was used to visualize the spread of anesthetic in close proximity to the above referenced structure.       3. A permanent image is entered into the patient's record.    Assessment/Narrative         The placement was negative for: blood aspirated, painful injection and site bleeding       Paresthesias: No.       Bolus given via needle..        Secured via.        Insertion/Infusion Method: Single Shot       Complications: none       Injection made incrementally with aspirations every 5 mL.     Comments:  The surgeon has given a verbal order transferring care of this patient to me for the performance of a regional analgesia block for post-op pain control. It is requested of me because I am uniquely trained and qualified to perform this block and the surgeon is neither trained nor qualified to perform this  procedure.    20 ml 0.5% bupivacaine

## 2022-09-27 NOTE — ANESTHESIA PROCEDURE NOTES
Saphenous Procedure Note    Pre-Procedure   Staff -        Anesthesiologist:  Perez Caputo MD       Performed By: Anesthesiologist       Location: pre-op       Pre-Anesthestic Checklist: patient identified, IV checked, site marked, risks and benefits discussed, informed consent, monitors and equipment checked, pre-op evaluation, at physician/surgeon's request and post-op pain management  Timeout:       Correct Patient: Yes        Correct Procedure: Yes        Correct Site: Yes        Correct Position: Yes        Correct Laterality: Yes        Site Marked: Yes  Procedure Documentation  Procedure: Saphenous       Diagnosis: ACL RUPTURE       Laterality: right       Patient Position: supine       Patient Prep/Sterile Barriers: sterile gloves, mask       Skin prep: Betadine       Needle Type: short bevel and insulated       Needle Gauge: 21.        Needle Length (Inches): 4        Ultrasound guided       1. Ultrasound was used to identify targeted nerve, plexus, vascular marker, or fascial plane and place a needle adjacent to it in real-time.       2. Ultrasound was used to visualize the spread of anesthetic in close proximity to the above referenced structure.       3. A permanent image is entered into the patient's record.    Assessment/Narrative         The placement was negative for: blood aspirated, painful injection and site bleeding       Paresthesias: No.       Bolus given via needle..        Secured via.        Insertion/Infusion Method: Single Shot       Complications: none       Injection made incrementally with aspirations every 5 mL.     Comments:  The surgeon has given a verbal order transferring care of this patient to me for the performance of a regional analgesia block for post-op pain control. It is requested of me because I am uniquely trained and qualified to perform this block and the surgeon is neither trained nor qualified to perform this procedure.    10 ml 0.5% bupivacaine

## 2022-09-27 NOTE — ANESTHESIA CARE TRANSFER NOTE
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    Anesthesia Type:   No value filed.     Note:      Level of Consciousness: drowsy  Oxygen Supplementation: face mask    Independent Airway: airway patency satisfactory and stable  Dentition: dentition unchanged  Vital Signs Stable: post-procedure vital signs reviewed and stable  Report to RN Given: handoff report given  Patient transferred to: PACU    Handoff Report: Identifed the Patient, Identified the Reponsible Provider, Reviewed the pertinent medical history, Discussed the surgical course, Reviewed Intra-OP anesthesia mangement and issues during anesthesia, Set expectations for post-procedure period and Allowed opportunity for questions and acknowledgement of understanding      Vitals:  Vitals Value Taken Time   BP     Temp     Pulse     Resp     SpO2         Electronically Signed By: AILYN Ruiz CRNA  September 27, 2022  12:58 PM

## 2022-09-27 NOTE — ANESTHESIA PREPROCEDURE EVALUATION
Anesthesia Pre-Procedure Evaluation    Patient: Brie Felipe   MRN: [id number] : 1995        Procedure : Procedure(s):  Right knee arthroscopic anterior cruciate ligament reconstruction with quadriceps tendon autograft, meniscus root repair  High tibial osteotomy, osteochondral allograft transplant of the medial femoral condyle with fresh graft, right knee          History reviewed. No pertinent past medical history.   History reviewed. No pertinent surgical history.   No Known Allergies   Social History     Tobacco Use     Smoking status: Never Smoker     Smokeless tobacco: Never Used   Substance Use Topics     Alcohol use: No      Wt Readings from Last 1 Encounters:   22 71.1 kg (156 lb 11.2 oz)        Anesthesia Evaluation   Pt has not had prior anesthetic         ROS/MED HX  ENT/Pulmonary:  - neg pulmonary ROS  (-) sleep apnea   Neurologic:       Cardiovascular:  - neg cardiovascular ROS     METS/Exercise Tolerance:     Hematologic:       Musculoskeletal:       GI/Hepatic:    (-) GERD   Renal/Genitourinary:       Endo:       Psychiatric/Substance Use:       Infectious Disease:       Malignancy:       Other:            Physical Exam    Airway        Mallampati: II   TM distance: > 3 FB   Neck ROM: full     Respiratory Devices and Support         Dental           Cardiovascular          Rhythm and rate: regular and normal     Pulmonary           breath sounds clear to auscultation           OUTSIDE LABS:  CBC: No results found for: WBC, HGB, HCT, PLT  BMP: No results found for: NA, POTASSIUM, CHLORIDE, CO2, BUN, CR, GLC  COAGS: No results found for: PTT, INR, FIBR  POC: No results found for: BGM, HCG, HCGS  HEPATIC: No results found for: ALBUMIN, PROTTOTAL, ALT, AST, GGT, ALKPHOS, BILITOTAL, BILIDIRECT, ДМИТРИЙ  OTHER: No results found for: PH, LACT, A1C, YOLANDE, PHOS, MAG, LIPASE, AMYLASE, TSH, T4, T3, CRP, SED    Anesthesia Plan    ASA Status:  1   NPO Status:  NPO Appropriate    Anesthesia Type:  General.     - Airway: LMA   Induction: Intravenous.   Maintenance: Balanced.        Consents    Anesthesia Plan(s) and associated risks, benefits, and realistic alternatives discussed. Questions answered and patient/representative(s) expressed understanding.    - Discussed:     - Discussed with:  Patient         Postoperative Care    Pain management: IV analgesics, Oral pain medications, Peripheral nerve block (Single Shot), Multi-modal analgesia.   PONV prophylaxis: Ondansetron (or other 5HT-3), Dexamethasone or Solumedrol     Comments:                Perez Caputo MD

## 2022-10-06 ENCOUNTER — DOCUMENTATION ONLY (OUTPATIENT)
Dept: SURGERY | Facility: CLINIC | Age: 27
End: 2022-10-06

## 2022-10-06 DIAGNOSIS — S83.511A RUPTURE OF ANTERIOR CRUCIATE LIGAMENT OF RIGHT KNEE, INITIAL ENCOUNTER: Primary | ICD-10-CM

## 2022-10-10 NOTE — OP NOTE
Procedure Date: 09/27/2022    PREOPERATIVE DIAGNOSES:    1.  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  2.  Posterior horn medial meniscus tear.  3.  Posterior horn lateral meniscus tear.  4.  Chondral defect, medial femoral condyle.  5.  Genu varum knee alignment.    POSTOPERATIVE DIAGNOSIS:   1.  Medial collateral ligament chronic disruption.  2.  Anterior cruciate ligament chronic rupture.  3.  Chondral defect, medial femoral condyle.  4.  Genu varum knee alignment.  5.  Medial meniscus radial repair of the anterior horn, mid body junction.  6.  Medial meniscus root tear.  7.  Lateral meniscus root tear.    PROCEDURE IN DETAIL:    1.  Right knee anterior cruciate ligament reconstruction utilizing quadriceps tendon autograft.  2.  Medial opening wedge high tibial osteotomy.  3.  Medial meniscus radial tear repair.  4.  Medial meniscus posterior horn root repair.  5.  Posterior horn lateral meniscus root repair.  6.  Medial collateral ligament reconstruction utilizing a soft tissue allograft.  7.  Posterior oblique ligament reconstruction utilizing soft tissue allograft.  8.  Open osteochondral allograft transplantation to the medial femoral condyle.    SURGEON:  Nahun Hammond MD     ASSISTANT:  Bro Santana PA-C    ANESTHESIA:  General with regional.    ESTIMATED BLOOD LOSS:  50 mL    INTRAOPERATIVE COMPLICATIONS:  None apparent.    OPERATIVE INDICATIONS:  The patient sustained an anterior cruciate ligament and MCL disruption and initially was slowed down from an activity standpoint, but then resumed soccer activities.  He continued to have regular routine episodes of instability.  He presented late for evaluation.  He was noted to have areas of full-thickness chondral loss in the medial femoral condyle with meniscus tears, both medial and lateral, chronic ACL and MCL deficiency and genu varum knee alignment.  Risks, benefits and alternatives to reconstruction were reviewed at length with the patient.   He elected to proceed.    DESCRIPTION OF PROCEDURE:  The patient was identified in the preoperative holding area and the operative site was marked.  Consent was again reviewed and all questions were answered.  He was taken to the operating room and placed under general anesthesia and positioned supine on the operating table.  The right lower extremity prepped and draped in the usual sterile fashion.  Preoperative antibiotics administered.  A timeout called to ensure the correct operative site, and procedure.  Tourniquet inflated to 250 mmHg.  Examination under anesthesia demonstrated grade 3 Lachman's with a 3+ pivot shift.  The knee was unstable to valgus stress at both 0 and 30 degrees with no reasonable endpoint.  There was a negative posterior drawer.  There was increased dial test by 15 degrees.    An anteromedial incision was extended over the knee extending from the superior pole of the patella all the way down to the pes tendon insertion.  Sharp dissection was carried down through skin and subcutaneous tissues in the distal aspect of the incision and the sartorial fascia was elevated in an inverted fashion subperiosteal to expose the posterior tibia all the way over to the proximal tib-fib joint.  The MCL was palpated and there were multiple calcified bodies that were removed at the area of his prior trauma.  The hamstring tendons had been previously ruptured and were poor quality and not felt to be amenable to utilization for autograft MCL reconstruction.  An allograft MCL and PLL reconstruction was chosen and a tibialis anterior allograft was opened with 14 and 12 mm grafts prepared with whipstitches on each end.  The arthroscopic instruments were then introduced and diagnostic arthroscopy showed no chondral wear to the patella or trochlea.  The lateral femoral condyle was without chondral wear in the lateral tibial plateau was without chondral wear.  The lateral meniscus posterior horn root was completely  disrupted.  The ACL was chronically deficient.  The PCL was noted to be intact.  The medial femoral condyle showed a large area of grade 3-4 chondromalacia far medial over a large area.  The medial tibial plateau showed some grade 2 chondral wear, but further medial deep into the meniscus there was a focal area of grade 3 chondromalacia.  The medial meniscus showed a radial oblique tear of the anterior horn, mid body junction.  The posterior horn root was also torn.    The meniscus was gently abraded at the surrounding capsule at the medial, radial tear.  A side-to-side mattress suture was placed with knots tied arthroscopically, which showed excellent restoration of the anatomic alignment of the tear at this level.  The posterior horn tissue was mobilized to allow for restoration closer back to the anatomic footprint. Two tape sutures were placed in a luggage tag fashion through the posterior horn of the medial meniscus.  The luggage tag sutures were also placed through the posterior horn of the lateral meniscus root.  The footprints were decorticated and debrided soft tissue at the anatomic footprint of the roots.  A quadriceps tendon graft was harvested with a 10 mm with double blade and brought to the back table and loaded with an RT and ABS TightRope secured with FiberTag sutures and a FiberTape suture placed through the RT button to act as an internal brace.  The graft was held on 20 pounds of tension during tunnel preparation.  The FlipCutter was introduced and a 10 mm socket created along the lateral wall of the notch at the anatomic footprint of the ACL.  A 10 mm FlipCutter was used to create the tibial socket.  Attention was then turned to the osteotomy.  The Arthrex iBalance jig was placed and under true lateral view of the knee to account for appropriate maintenance of tibial slope.  The jig was pinned in place.  The hinge pin was placed lateral.  The posterior neurovascular shield was then positioned.   After reaming for the plate and collecting autograft bone, the osteotomy was completed over to the hinge pin.  The knee was gapped open and the size 5 medium PEEK iBalance implant was positioned.  AlloSync Pure allograft was mixed with autologous-conditioned plasma and used to graft the area of the osteotomy opening lateral to the implant.  It was secured with 2 cancellous screws placed proximal and 2 bicortical PEEK screws distal.  Transtibial drilling was performed, exiting at the anatomic footprint of the posterior horn of the medial meniscus root tear as well as the posterior horn lateral meniscus root tear.  This was used to shuttle the luggage tag sutures transtibial with tension applied these were noted to sit back to their appropriate footprint.  The medial root was slightly medial to the anatomic based on the nature of the tear pattern and the chronic nature of the tear that did not allow for full excursion despite mobilizing the surrounding peripheral capsular tissue.  The ACL graft was then placed through the RT button flipped along the far lateral cortex and shortened to draw the graft into the femoral socket.  The graft was then passed into the tibial socket and partially tensioned over a 4-hole button along the anteromedial tibia.  Attention was then turned to the MCL reconstruction and the femoral footprint was identified just proximal and posterior to the medial epicondyle referencing adductor tubercle.  A 7 mm reamer was used here.  The PLL socket was created closer to the gastrocnemius tubercle and another transfemoral socket was created.  The grafts were passed and secured with SwiveLock anchors on the femoral side.  The superficial MCL graft was then passed deep to the sartorial fascia and secured in the posteromedial tibia with the knee held at 30 degrees of flexion and neutral rotation utilizing a 7 mm BioComposite interference screw.  The PLL was then secured in full extension with a  BioComposite screw, drilling the socket at the level of the anterior arm of the semimembranosus with care made to not create any tunnel convergence.  Testing demonstrated excellent restoration of stability to apply valgus stress.  The ACL was then finally tensioned with the knee held in full extension.  Lachman's was noted to be restored.  The medial and lateral meniscus root sutures were secured with a single 4.75 mm SwiveLock with the knee held in 90 degrees of flexion and careful visualization of the root tissue sitting back down to the prepared bone bed. The medial parapatellar arthrotomy was extended proximal to expose the medial femur.  The area of chondral defect was sized for the BioUni allograft technique with the 20 mm BioUni.  This was demarcated on the corresponding allograft femoral condyle and this graft was harvested utilizing the BioUni technique.  The pins were then placed on the femoral recipient site and scored and punched to the appropriate depth.  Reaming was completed, followed by removing any additional central recipient bone.  A nanofracture was performed throughout the base of the defect.  The allograft was soaked in autologous-conditioned plasma after irrigation with pulsatile lavage to remove any marrow elements.  The graft was then seated into the recipient site with excellent flush seating and good secure fit fixation.  The knee was taken through range of motion and there was no block to full motion.  There remained good stability.  The wound was then copiously irrigated.  The arthrotomy was closed in layers.  Remainder of the incisions were closed in layers including a tight closure of the sartorial fascia over the iBalance implant.  Sterile dressings and a brace were applied.  The patient was then awoken from general anesthesia and transferred to the postanesthesia care unit in stable condition.    Bro Santana PA-C, was present and scrubbed throughout the case and his assistance was  critical for patient positioning, assistance with prepping, draping, multiple graft preparations, leg manipulation, wound closure, dressing and brace application.    Nahun Hammond MD        D: 10/10/2022   T: 10/10/2022   MT: [id number]    Name:     DHARA PROCTOR  MRN:      [id number]        Account:        [id number]   :      1995           Procedure Date: 2022     Document: [id number]

## 2023-09-26 NOTE — ANESTHESIA POSTPROCEDURE EVALUATION
Patient: Brie Felipe    Procedure: Procedure(s):  Right knee arthroscopic anterior cruciate ligament reconstruction with quadriceps tendon autograft, meniscus root repair, medial colateral ligament reconstruction, medial and lateral meniscal repair  High tibial osteotomy, osteochondral allograft transplant of the medial femoral condyle with fresh graft, right knee       Anesthesia Type:  General    Note:  Disposition: Outpatient   Postop Pain Control: Uneventful            Sign Out: Well controlled pain   PONV: No   Neuro/Psych: Uneventful            Sign Out: Acceptable/Baseline neuro status   Airway/Respiratory: Uneventful            Sign Out: Acceptable/Baseline resp. status   CV/Hemodynamics: Uneventful            Sign Out: Acceptable CV status; No obvious hypovolemia; No obvious fluid overload   Other NRE: NONE   DID A NON-ROUTINE EVENT OCCUR? No           Last vitals:  Vitals Value Taken Time   /97 09/27/22 1345   Temp 97.6  F (36.4  C) 09/27/22 1255   Pulse 88 09/27/22 1345   Resp 18 09/27/22 1345   SpO2 100 % 09/27/22 1334   Vitals shown include unvalidated device data.    Electronically Signed By: Perez Caputo MD  September 27, 2022  1:54 PM  
none

## (undated) DEVICE — ESU GROUND PAD ADULT W/CORD E7507

## (undated) DEVICE — SU FIBERWIRE 0 38" BLUE 22.2MM W/TAPER NDL  AR-7250

## (undated) DEVICE — Device

## (undated) DEVICE — SOL ADH LIQUID BENZOIN SWAB 0.6ML C1544

## (undated) DEVICE — FIBERLOOP

## (undated) DEVICE — SUCTION WATERBUG FLOOR

## (undated) DEVICE — SUCTION MANIFOLD NEPTUNE 2 SYS 4 PORT 0702-020-000

## (undated) DEVICE — SYR 20ML LL W/O NDL 302830

## (undated) DEVICE — NDL COUNTER 20CT 31142493

## (undated) DEVICE — BNDG COBAN 6"X5YDS STERILE

## (undated) DEVICE — DRAPE CONVERTORS U-DRAPE 60X72" 8476

## (undated) DEVICE — BLADE SAW SAGITTAL STRK 18X90X1.27MM HD SYS 6 6118-127-090

## (undated) DEVICE — TOURNIQUET SGL  BLADDER 30"X4" BLUE 5921030135

## (undated) DEVICE — TIGERLINK

## (undated) DEVICE — BUR ARTHREX COOLCUT DISSECTOR 5.5MM AR-8550DS

## (undated) DEVICE — GLOVE PROTEXIS BLUE W/NEU-THERA 8.0  2D73EB80

## (undated) DEVICE — FIBERLINK

## (undated) DEVICE — GLOVE PROTEXIS BLUE W/NEU-THERA 7.5  2D73EB75

## (undated) DEVICE — SUCTION TIP YANKAUER W/O VENT K86

## (undated) DEVICE — SU MONOCRYL 4-0 PS-2 27" UND Y426H

## (undated) DEVICE — SYR 05ML LL W/O NDL

## (undated) DEVICE — PREP CHLORAPREP 26ML TINTED HI-LITE ORANGE 930815

## (undated) DEVICE — DECANTER VIAL 2006S

## (undated) DEVICE — SU FIBERWIRE #2 FIBERSTICK 50"  AR-7209

## (undated) DEVICE — BAG CLEAR TRASH 1.3M 39X33" P4040C

## (undated) DEVICE — KIT ARTHREX TRANSTIBIAL ACL DISP W/SAW BLADE AR-1897S

## (undated) DEVICE — ESU PENCIL W/HOLSTER E2350H

## (undated) DEVICE — DRSG ABDOMINAL 07 1/2X8" 7197D

## (undated) DEVICE — CAST PADDING 6" STERILE 9046S

## (undated) DEVICE — PAD FOAM TRIANGLE KNEE 193-P

## (undated) DEVICE — SU TIGERSTICK #2 TIGERWIRE 50" STIFF END 12" AR-7209T

## (undated) DEVICE — BNDG ELASTIC 6" DBL LENGTH UNSTERILE 6611-16

## (undated) DEVICE — GLOVE PROTEXIS POWDER FREE 8.0 ORTHOPEDIC 2D73ET80

## (undated) DEVICE — KIT BLOOD COLLECTION ARTHREX SERIES I ACP BLD DRAW ABS-10011

## (undated) DEVICE — SPONGE LAP 18X18" X8435

## (undated) DEVICE — TUBING ARTHROSCOPY PUMP ARTHREX AR-6410

## (undated) DEVICE — GLOVE PROTEXIS POWDER FREE 7.5 ORTHOPEDIC 2D73ET75

## (undated) DEVICE — SOL NACL 0.9% IRRIG 1000ML BOTTLE 2F7124

## (undated) DEVICE — PACK ARTHROSCOPY KNEE

## (undated) DEVICE — DRSG STERI STRIP 1/2X4" R1547

## (undated) DEVICE — DRAPE C-ARMOR 5 SIDED 5523

## (undated) DEVICE — BUR ARTHREX COOLCUT EXCALIBUR CVD 4.0MMX13CM AR-8400CEX

## (undated) DEVICE — SET HANDPIECE INTERPULSE W/COAXIAL FAN SPRAY TIP 0210118000

## (undated) DEVICE — BUR SHAVER ARTHRO 5.5MM CYCLONE H9119

## (undated) DEVICE — PACK LOWER EXTREMITY RIDGES

## (undated) DEVICE — SU MONOCRYL 3-0 PS-1 27" Y936H

## (undated) DEVICE — DEVICE PASSER SU FIBERTAPE ARTHREX 54" 2MM BLUE AR-7237

## (undated) DEVICE — SU VICRYL 0 CT-1 36" J346H

## (undated) DEVICE — BLADE SAW SAGITTAL STRK 13X90X1.27MM HD SYS 6 6113-127-090

## (undated) DEVICE — SU VICRYL 0 CT-2 27" J334H

## (undated) DEVICE — SPECIMEN CONTAINER 5OZ STERILE 2600SA

## (undated) DEVICE — SU ETHILON 3-0 PS-2 18" 1669H

## (undated) DEVICE — DRSG GAUZE 4X4" TRAY

## (undated) DEVICE — SYR BULB IRRIG 50ML LATEX FREE 0035280

## (undated) DEVICE — SU VICRYL 0 CT-2 CR 8X18" J727D

## (undated) DEVICE — COVER FOOTSWITCH W/CINCH 20X24" 923267

## (undated) DEVICE — FLIPCUTTER III DRILL

## (undated) DEVICE — ABLATOR ARTHREX APOLLO RF MP90 ASPIRATING 90DEG AR-9811

## (undated) DEVICE — PACK SET-UP STD 9102

## (undated) DEVICE — LINEN HALF SHEET 5512

## (undated) DEVICE — SOL NACL 0.9% IRRIG 3000ML BAG 2B7477

## (undated) DEVICE — DRAPE C-ARM 60X42" 1013

## (undated) DEVICE — LINEN FULL SHEET 5511

## (undated) DEVICE — SU VICRYL+ 1 MO-4 18" DYED VCP702D

## (undated) DEVICE — LIGHT HANDLE X2

## (undated) DEVICE — LINEN ORTHO ACL PACK 5447

## (undated) DEVICE — BASIN SET MINOR DISP

## (undated) DEVICE — SU VICRYL 2-0 CT-2 27" UND J269H

## (undated) DEVICE — DRAPE STOCKINETTE IMPERVIOUS 12" 1587

## (undated) DEVICE — BLADE SAW SAGITTAL 18MMW X 90MML BEVELED EDGE 6118097090

## (undated) DEVICE — NDL ARTHREX SCORPION KNEE 2-0 AR-12990N

## (undated) RX ORDER — CEFAZOLIN SODIUM/WATER 2 G/20 ML
SYRINGE (ML) INTRAVENOUS
Status: DISPENSED
Start: 2022-09-27

## (undated) RX ORDER — IBUPROFEN 600 MG/1
TABLET, FILM COATED ORAL
Status: DISPENSED
Start: 2022-09-27

## (undated) RX ORDER — DEXAMETHASONE SODIUM PHOSPHATE 4 MG/ML
INJECTION, SOLUTION INTRA-ARTICULAR; INTRALESIONAL; INTRAMUSCULAR; INTRAVENOUS; SOFT TISSUE
Status: DISPENSED
Start: 2022-09-27

## (undated) RX ORDER — TRANEXAMIC ACID 650 MG/1
TABLET ORAL
Status: DISPENSED
Start: 2022-09-27

## (undated) RX ORDER — FENTANYL CITRATE 50 UG/ML
INJECTION, SOLUTION INTRAMUSCULAR; INTRAVENOUS
Status: DISPENSED
Start: 2022-09-27

## (undated) RX ORDER — PROPOFOL 10 MG/ML
INJECTION, EMULSION INTRAVENOUS
Status: DISPENSED
Start: 2022-09-27

## (undated) RX ORDER — LIDOCAINE HYDROCHLORIDE 10 MG/ML
INJECTION, SOLUTION EPIDURAL; INFILTRATION; INTRACAUDAL; PERINEURAL
Status: DISPENSED
Start: 2022-09-27

## (undated) RX ORDER — CEFAZOLIN SODIUM 1 G/3ML
INJECTION, POWDER, FOR SOLUTION INTRAMUSCULAR; INTRAVENOUS
Status: DISPENSED
Start: 2022-09-27

## (undated) RX ORDER — TRANEXAMIC ACID 100 MG/ML
INJECTION, SOLUTION INTRAVENOUS
Status: DISPENSED
Start: 2022-09-27

## (undated) RX ORDER — VANCOMYCIN HYDROCHLORIDE 1 G/20ML
INJECTION, POWDER, LYOPHILIZED, FOR SOLUTION INTRAVENOUS
Status: DISPENSED
Start: 2022-09-27

## (undated) RX ORDER — ONDANSETRON 2 MG/ML
INJECTION INTRAMUSCULAR; INTRAVENOUS
Status: DISPENSED
Start: 2022-09-27

## (undated) RX ORDER — GLYCOPYRROLATE 0.2 MG/ML
INJECTION INTRAMUSCULAR; INTRAVENOUS
Status: DISPENSED
Start: 2022-09-27

## (undated) RX ORDER — ACETAMINOPHEN 325 MG/1
TABLET ORAL
Status: DISPENSED
Start: 2022-09-27

## (undated) RX ORDER — OXYCODONE HYDROCHLORIDE 5 MG/1
TABLET ORAL
Status: DISPENSED
Start: 2022-09-27